# Patient Record
Sex: MALE | Race: OTHER | HISPANIC OR LATINO | URBAN - METROPOLITAN AREA
[De-identification: names, ages, dates, MRNs, and addresses within clinical notes are randomized per-mention and may not be internally consistent; named-entity substitution may affect disease eponyms.]

---

## 2017-12-06 ENCOUNTER — INPATIENT (INPATIENT)
Facility: HOSPITAL | Age: 57
LOS: 0 days | Discharge: HOME | DRG: 392 | End: 2017-12-07
Attending: INTERNAL MEDICINE | Admitting: INTERNAL MEDICINE
Payer: COMMERCIAL

## 2017-12-06 VITALS
TEMPERATURE: 98 F | SYSTOLIC BLOOD PRESSURE: 115 MMHG | WEIGHT: 171.96 LBS | DIASTOLIC BLOOD PRESSURE: 82 MMHG | HEART RATE: 91 BPM | RESPIRATION RATE: 18 BRPM | OXYGEN SATURATION: 95 %

## 2017-12-06 LAB
HCT VFR BLD CALC: 46 % — SIGNIFICANT CHANGE UP (ref 39–50)
HGB BLD-MCNC: 15.2 G/DL — SIGNIFICANT CHANGE UP (ref 13–17)
LIDOCAIN IGE QN: 56 U/L — SIGNIFICANT CHANGE UP (ref 7–60)
MCHC RBC-ENTMCNC: 28.7 PG — SIGNIFICANT CHANGE UP (ref 27–34)
MCHC RBC-ENTMCNC: 33 G/DL — SIGNIFICANT CHANGE UP (ref 32–36)
MCV RBC AUTO: 86.8 FL — SIGNIFICANT CHANGE UP (ref 80–100)
PLATELET # BLD AUTO: 178 K/UL — SIGNIFICANT CHANGE UP (ref 150–400)
RBC # BLD: 5.3 M/UL — SIGNIFICANT CHANGE UP (ref 4.2–5.8)
RBC # FLD: 13.1 % — SIGNIFICANT CHANGE UP (ref 10.3–16.9)
WBC # BLD: 13 K/UL — HIGH (ref 3.8–10.5)
WBC # FLD AUTO: 13 K/UL — HIGH (ref 3.8–10.5)

## 2017-12-06 PROCEDURE — 99285 EMERGENCY DEPT VISIT HI MDM: CPT

## 2017-12-06 PROCEDURE — 74177 CT ABD & PELVIS W/CONTRAST: CPT | Mod: 26

## 2017-12-06 PROCEDURE — 99223 1ST HOSP IP/OBS HIGH 75: CPT | Mod: GC

## 2017-12-06 RX ORDER — PIPERACILLIN AND TAZOBACTAM 4; .5 G/20ML; G/20ML
3.38 INJECTION, POWDER, LYOPHILIZED, FOR SOLUTION INTRAVENOUS ONCE
Qty: 0 | Refills: 0 | Status: COMPLETED | OUTPATIENT
Start: 2017-12-06 | End: 2017-12-06

## 2017-12-06 RX ORDER — IOHEXOL 300 MG/ML
50 INJECTION, SOLUTION INTRAVENOUS ONCE
Qty: 0 | Refills: 0 | Status: COMPLETED | OUTPATIENT
Start: 2017-12-06 | End: 2017-12-06

## 2017-12-06 RX ADMIN — IOHEXOL 50 MILLILITER(S): 300 INJECTION, SOLUTION INTRAVENOUS at 16:38

## 2017-12-06 RX ADMIN — PIPERACILLIN AND TAZOBACTAM 200 GRAM(S): 4; .5 INJECTION, POWDER, LYOPHILIZED, FOR SOLUTION INTRAVENOUS at 20:07

## 2017-12-06 NOTE — H&P ADULT - PROBLEM SELECTOR PLAN 1
improving. Elevated WBC and leukocytosis. However with subjective fevers and chills at home. improving. Elevated WBC and leukocytosis. However with subjective fevers and chills at home. meeting sepsis criteria briefly with elevated HR above 91, however now improved. No evidence of bleeding in over 24 hours. Hb stable x2. tolerating PO.   - admit to [facility]   - Con't PO cipro and flagyl   - tylenol for pain control   - encourage PO fluid hydration   - trend CBC   - consider f/u CT upon resolution of acute illness, given read concerning for colitis vs. lymphoma

## 2017-12-06 NOTE — ED ADULT TRIAGE NOTE - CHIEF COMPLAINT QUOTE
abdominal pain with blood with loose stools started last night - went to urgent care and sent here for further evaluation and treatment - HX colon polyps;

## 2017-12-06 NOTE — H&P ADULT - NSHPPHYSICALEXAM_GEN_ALL_CORE
.  VITAL SIGNS:  T(C): 36.5 (12-07-17 @ 05:33), Max: 37.2 (12-06-17 @ 17:01)  T(F): 97.7 (12-07-17 @ 05:33), Max: 98.9 (12-06-17 @ 17:01)  HR: 73 (12-07-17 @ 05:33) (73 - 91)  BP: 109/62 (12-07-17 @ 05:33) (108/70 - 116/75)  BP(mean): --  RR: 18 (12-07-17 @ 05:33) (18 - 18)  SpO2: 95% (12-07-17 @ 05:33) (95% - 97%)  Wt(kg): --    PHYSICAL EXAM:    Constitutional: WDWN resting comfortably in bed; NAD  Head: NC/AT  Eyes: PERRL, EOMI, anicteric sclera  ENT: no nasal discharge; uvula midline, no oropharyngeal erythema or exudates; MMM  Neck: supple; no JVD or thyromegaly  Respiratory: CTA B/L; no W/R/R, no retractions  Cardiac: +S1/S2; RRR; no M/R/G; PMI non-displaced  Gastrointestinal: soft, ND; tenderness to palpation RLQ; no rebound or guarding; +BSx4; no stool/blood present in rectal vault  Back: spine midline, no bony tenderness or step-offs; no CVAT B/L  Extremities: WWP, no clubbing or cyanosis; no peripheral edema  Musculoskeletal: NROM x4; no joint swelling, tenderness or erythema  Vascular: 2+ radial, femoral, DP/PT pulses B/L  Dermatologic: skin warm, dry and intact; no rashes, wounds, or scars  Lymphatic: no submandibular or cervical LAD  Neurologic: AAOx3; CNII-XII grossly intact; no focal deficits  Psychiatric: affect and characteristics of appearance, verbalizations, behaviors are appropriate

## 2017-12-06 NOTE — H&P ADULT - ATTENDING COMMENTS
Pt seen and examined at bedside on 12/6 @ 2946    Agree with HPI, ROS as above    VS, Labs, FH, SH, allergies, medications, imaging reviewed. Agree with physical exam as above    A/P: 57yo M w/ PMH HLD presenting with 3 days abdominal pain and 3 episodes of blood mixed with mucous from his rectum with leukocytosis and CT evidence of colitis.     **Sepsis  -Pt meeting 2/4 SIRS criteria on admission with evidence of colitis on CT a/p  -C/w cirpo/flagyl for tx of colitis  -LA not done in ED  -Pt HD stable  -Blood cultures not done in ED, will obtain if patient's clinical status changes    **HLD  -C/w home Lip    **Coagulopathy  -Mild, likely 2/2 decreased PO intake    Plan otherwise as outlined above....

## 2017-12-06 NOTE — H&P ADULT - HISTORY OF PRESENT ILLNESS
Pt presents with BRBPR. PMH HLD presenting w/ 3 episodes of blood with mucous presenting with LLQ tenderness.     In the ED, patient had colitis on CT. Improved after IV fluids and tolerating PO. Pt is a 55yo M w/ PMH HLD presenting w/ 3 episodes of blood with mucous and with LLQ tenderness. He reports his symptoms started after cooking a meal with ground beef 3 days ago. He reports having constipation initially, (last BM 3 days ago) followed by passing mucous with small amount of blood three times yesterday. he developed pain in his lower abdomen that did not radiate, was constant, and worsened throughout the day. His  said he could barely move. He was, however still tolerating PO and passing gas. He felt subjective fevers and chills at home, and came in to the ED today. He denies any nausea/vomiting, cough, SOB, chest pain, rash, dysuria, LE edema, difficulty walking. His symptoms at time of exam have significantly improved from 9/10 to 4/10, and the patient is hungry and walking without difficulty. He reports he had a bowel movement after drinking oral contrast that was brown in color. He had a colonoscopy last spring at [facility] to r/o microscopic colitis, and was found to have one hyperplastic polyp and internal hemorrhoids.    In the ED, patient had colitis on CT. Improved after IV fluids and tolerating PO.

## 2017-12-06 NOTE — ED PROVIDER NOTE - PROGRESS NOTE DETAILS
CT reviewed, pt still w significant pain, given extent of diverticulitis w bleeding and age, will keep for iv abx and possible GI eval. CT reviewed, pt still w significant pain but improving, given extent of diverticulitis w hx of bleeding and age, will keep for iv abx and possible GI eval.

## 2017-12-06 NOTE — H&P ADULT - NSHPLABSRESULTS_GEN_ALL_CORE
CBC Full  -  ( 06 Dec 2017 20:12 )  WBC Count : 13.0 K/uL  Hemoglobin : 15.2 g/dL  Hematocrit : 46.0 %  Platelet Count - Automated : 178 K/uL  Mean Cell Volume : 86.8 fL  Mean Cell Hemoglobin : 28.7 pg  Mean Cell Hemoglobin Concentration : 33.0 g/dL    12-06    135  |  96  |  15  ----------------------------<  98  4.3   |  22  |  1.12    Ca    9.8      06 Dec 2017 15:20    TPro  8.4<H>  /  Alb  4.8  /  TBili  2.4<H>  /  DBili  x   /  AST  25  /  ALT  20  /  AlkPhos  82  12-06    < from: CT Abdomen and Pelvis w/ Oral Cont and w/ IV Cont (12.06.17 @ 19:10) >    IMPRESSION: Long segment of wall thickening of the proximal and mid   sigmoid colon. No colonic diverticulaidentified. Infiltration of the   surrounding pericolic fat. No pericolic abscess or microperforation.   Findings could represent colitis of infectious or inflammatory etiology.   Acute diverticulitis or tumor i.e. adenocarcinoma or lymphoma cannot   however cannot be ruled out.        < end of copied text >

## 2017-12-06 NOTE — ED PROVIDER NOTE - MEDICAL DECISION MAKING DETAILS
Abd pain w complaints of brb x3, no active bleeding. Given exam P: labs, imaging, ivf reeval. Abd pain w complaints of brb x3, no active bleeding. Given exam P: labs, imaging,ppi,  ivf reeval.

## 2017-12-06 NOTE — H&P ADULT - ASSESSMENT
57yo M w/ PMH HLD presenting with 3 days abdominal pain and 3 episodes of blood mixed with mucous from his rectum with leukocytosis and CT evidence of colitis.

## 2017-12-06 NOTE — ED PROVIDER NOTE - PHYSICAL EXAMINATION
CONSTITUTIONAL: Well appearing, well nourished, awake, alert and in no apparent distress.  HEENT: Head is atraumatic. Eyes clear bilaterally, normal EOMI. Airway patent.  CARDIAC: Normal rate, regular rhythm.  Heart sounds S1, S2.   RESPIRATORY: Breath sounds clear and equal bilaterally.  GASTROINTESTINAL: Abdomen soft, non-tender, no guarding.  MUSCULOSKELETAL: Spine appears normal, range of motion is not limited, no muscle or joint tenderness.   NEUROLOGICAL: Alert and oriented, no focal deficits, no motor or sensory deficits.  SKIN: Skin normal color for race, warm, dry and intact. No evidence of rash.  PSYCHIATRIC: Alert and oriented to person, place, time/situation. normal mood and affect. no apparent risk to self or others.  Rectal : no blood in vault CONSTITUTIONAL: Well appearing, well nourished, awake, alert and in no apparent distress.  HEENT: Head is atraumatic. Eyes clear bilaterally, normal EOMI. Airway patent.  CARDIAC: Normal rate, regular rhythm.  Heart sounds S1, S2.   RESPIRATORY: Breath sounds clear and equal bilaterally.  GASTROINTESTINAL: Abdomen soft, severe tenderness w guarding in llq, no distension  MUSCULOSKELETAL: Spine appears normal, range of motion is not limited, no muscle or joint tenderness.   NEUROLOGICAL: Alert and oriented, no focal deficits, no motor or sensory deficits.  SKIN: Skin normal color for race, warm, dry and intact. No evidence of rash.  PSYCHIATRIC: Alert and oriented to person, place, time/situation. normal mood and affect. no apparent risk to self or others.  Rectal : no blood in vault

## 2017-12-06 NOTE — H&P ADULT - PROBLEM SELECTOR PLAN 4
low risk, no need for pharmacologic prophylaxis, particularly in setting of recent bleed.    Dispo - likely home in AM

## 2017-12-06 NOTE — ED PROVIDER NOTE - OBJECTIVE STATEMENT
55 yo hx of hdl w complaints of abd pain and brb x3, small amounts of blood since yesterday, no recent travel, prior surgeries. no blood thinner use. no f/c/n/v. Pain LLQ, aching in nature. 55 yo hx of hdl w complaints of abd pain and brb x3, small amounts of blood since yesterday, no recent travel, prior surgeries. no blood thinner use. no f/c/n/v. Pain LLQ, severe, aching in nature.

## 2017-12-07 ENCOUNTER — TRANSCRIPTION ENCOUNTER (OUTPATIENT)
Age: 57
End: 2017-12-07

## 2017-12-07 VITALS
RESPIRATION RATE: 18 BRPM | TEMPERATURE: 97 F | OXYGEN SATURATION: 94 % | SYSTOLIC BLOOD PRESSURE: 113 MMHG | HEART RATE: 74 BPM | DIASTOLIC BLOOD PRESSURE: 69 MMHG

## 2017-12-07 DIAGNOSIS — E78.00 PURE HYPERCHOLESTEROLEMIA, UNSPECIFIED: ICD-10-CM

## 2017-12-07 DIAGNOSIS — R63.8 OTHER SYMPTOMS AND SIGNS CONCERNING FOOD AND FLUID INTAKE: ICD-10-CM

## 2017-12-07 DIAGNOSIS — Z90.49 ACQUIRED ABSENCE OF OTHER SPECIFIED PARTS OF DIGESTIVE TRACT: Chronic | ICD-10-CM

## 2017-12-07 DIAGNOSIS — Z29.9 ENCOUNTER FOR PROPHYLACTIC MEASURES, UNSPECIFIED: ICD-10-CM

## 2017-12-07 DIAGNOSIS — K52.9 NONINFECTIVE GASTROENTERITIS AND COLITIS, UNSPECIFIED: ICD-10-CM

## 2017-12-07 LAB
ANION GAP SERPL CALC-SCNC: 13 MMOL/L — SIGNIFICANT CHANGE UP (ref 5–17)
BASOPHILS NFR BLD AUTO: 0.3 % — SIGNIFICANT CHANGE UP (ref 0–2)
BUN SERPL-MCNC: 15 MG/DL — SIGNIFICANT CHANGE UP (ref 7–23)
CALCIUM SERPL-MCNC: 9.4 MG/DL — SIGNIFICANT CHANGE UP (ref 8.4–10.5)
CHLORIDE SERPL-SCNC: 100 MMOL/L — SIGNIFICANT CHANGE UP (ref 96–108)
CO2 SERPL-SCNC: 24 MMOL/L — SIGNIFICANT CHANGE UP (ref 22–31)
CREAT SERPL-MCNC: 1.16 MG/DL — SIGNIFICANT CHANGE UP (ref 0.5–1.3)
EOSINOPHIL NFR BLD AUTO: 1.5 % — SIGNIFICANT CHANGE UP (ref 0–6)
GLUCOSE SERPL-MCNC: 103 MG/DL — HIGH (ref 70–99)
HCT VFR BLD CALC: 42.7 % — SIGNIFICANT CHANGE UP (ref 39–50)
HGB BLD-MCNC: 14.2 G/DL — SIGNIFICANT CHANGE UP (ref 13–17)
LYMPHOCYTES # BLD AUTO: 20.6 % — SIGNIFICANT CHANGE UP (ref 13–44)
MAGNESIUM SERPL-MCNC: 2.4 MG/DL — SIGNIFICANT CHANGE UP (ref 1.6–2.6)
MCHC RBC-ENTMCNC: 29.2 PG — SIGNIFICANT CHANGE UP (ref 27–34)
MCHC RBC-ENTMCNC: 33.3 G/DL — SIGNIFICANT CHANGE UP (ref 32–36)
MCV RBC AUTO: 87.9 FL — SIGNIFICANT CHANGE UP (ref 80–100)
MONOCYTES NFR BLD AUTO: 9.3 % — SIGNIFICANT CHANGE UP (ref 2–14)
NEUTROPHILS NFR BLD AUTO: 68.3 % — SIGNIFICANT CHANGE UP (ref 43–77)
PLATELET # BLD AUTO: 154 K/UL — SIGNIFICANT CHANGE UP (ref 150–400)
POTASSIUM SERPL-MCNC: 4.4 MMOL/L — SIGNIFICANT CHANGE UP (ref 3.5–5.3)
POTASSIUM SERPL-SCNC: 4.4 MMOL/L — SIGNIFICANT CHANGE UP (ref 3.5–5.3)
RBC # BLD: 4.86 M/UL — SIGNIFICANT CHANGE UP (ref 4.2–5.8)
RBC # FLD: 13.2 % — SIGNIFICANT CHANGE UP (ref 10.3–16.9)
SODIUM SERPL-SCNC: 137 MMOL/L — SIGNIFICANT CHANGE UP (ref 135–145)
WBC # BLD: 9.5 K/UL — SIGNIFICANT CHANGE UP (ref 3.8–10.5)
WBC # FLD AUTO: 9.5 K/UL — SIGNIFICANT CHANGE UP (ref 3.8–10.5)

## 2017-12-07 PROCEDURE — 85025 COMPLETE CBC W/AUTO DIFF WBC: CPT

## 2017-12-07 PROCEDURE — 83735 ASSAY OF MAGNESIUM: CPT

## 2017-12-07 PROCEDURE — 99239 HOSP IP/OBS DSCHRG MGMT >30: CPT

## 2017-12-07 PROCEDURE — 85610 PROTHROMBIN TIME: CPT

## 2017-12-07 PROCEDURE — 83690 ASSAY OF LIPASE: CPT

## 2017-12-07 PROCEDURE — 99285 EMERGENCY DEPT VISIT HI MDM: CPT | Mod: 25

## 2017-12-07 PROCEDURE — 96374 THER/PROPH/DIAG INJ IV PUSH: CPT | Mod: XU

## 2017-12-07 PROCEDURE — 74177 CT ABD & PELVIS W/CONTRAST: CPT

## 2017-12-07 PROCEDURE — 36415 COLL VENOUS BLD VENIPUNCTURE: CPT

## 2017-12-07 PROCEDURE — 85730 THROMBOPLASTIN TIME PARTIAL: CPT

## 2017-12-07 PROCEDURE — 80048 BASIC METABOLIC PNL TOTAL CA: CPT

## 2017-12-07 PROCEDURE — 81001 URINALYSIS AUTO W/SCOPE: CPT

## 2017-12-07 PROCEDURE — 80053 COMPREHEN METABOLIC PANEL: CPT

## 2017-12-07 PROCEDURE — 85027 COMPLETE CBC AUTOMATED: CPT

## 2017-12-07 RX ORDER — ASPIRIN/CALCIUM CARB/MAGNESIUM 324 MG
1 TABLET ORAL
Qty: 0 | Refills: 0 | COMMUNITY

## 2017-12-07 RX ORDER — ATORVASTATIN CALCIUM 80 MG/1
2 TABLET, FILM COATED ORAL
Qty: 0 | Refills: 0 | COMMUNITY

## 2017-12-07 RX ORDER — METRONIDAZOLE 500 MG
1 TABLET ORAL
Qty: 15 | Refills: 0 | OUTPATIENT
Start: 2017-12-07 | End: 2017-12-12

## 2017-12-07 RX ORDER — CIPROFLOXACIN LACTATE 400MG/40ML
1 VIAL (ML) INTRAVENOUS
Qty: 10 | Refills: 0 | OUTPATIENT
Start: 2017-12-07 | End: 2017-12-12

## 2017-12-07 RX ORDER — ATORVASTATIN CALCIUM 80 MG/1
20 TABLET, FILM COATED ORAL AT BEDTIME
Qty: 0 | Refills: 0 | Status: DISCONTINUED | OUTPATIENT
Start: 2017-12-07 | End: 2017-12-07

## 2017-12-07 RX ORDER — ATORVASTATIN CALCIUM 80 MG/1
1 TABLET, FILM COATED ORAL
Qty: 0 | Refills: 0 | COMMUNITY

## 2017-12-07 RX ORDER — SIMETHICONE 80 MG/1
80 TABLET, CHEWABLE ORAL ONCE
Qty: 0 | Refills: 0 | Status: COMPLETED | OUTPATIENT
Start: 2017-12-07 | End: 2017-12-07

## 2017-12-07 RX ORDER — METRONIDAZOLE 500 MG
500 TABLET ORAL EVERY 8 HOURS
Qty: 0 | Refills: 0 | Status: DISCONTINUED | OUTPATIENT
Start: 2017-12-07 | End: 2017-12-07

## 2017-12-07 RX ORDER — CIPROFLOXACIN LACTATE 400MG/40ML
500 VIAL (ML) INTRAVENOUS EVERY 12 HOURS
Qty: 0 | Refills: 0 | Status: DISCONTINUED | OUTPATIENT
Start: 2017-12-07 | End: 2017-12-07

## 2017-12-07 RX ORDER — ACETAMINOPHEN 500 MG
650 TABLET ORAL EVERY 6 HOURS
Qty: 0 | Refills: 0 | Status: DISCONTINUED | OUTPATIENT
Start: 2017-12-07 | End: 2017-12-07

## 2017-12-07 RX ADMIN — Medication 650 MILLIGRAM(S): at 05:24

## 2017-12-07 RX ADMIN — Medication 500 MILLIGRAM(S): at 12:46

## 2017-12-07 RX ADMIN — SIMETHICONE 80 MILLIGRAM(S): 80 TABLET, CHEWABLE ORAL at 09:27

## 2017-12-07 RX ADMIN — Medication 500 MILLIGRAM(S): at 04:29

## 2017-12-07 RX ADMIN — Medication 650 MILLIGRAM(S): at 05:55

## 2017-12-07 NOTE — DISCHARGE NOTE ADULT - PATIENT PORTAL LINK FT
“You can access the [employer] Patient Portal, offered by [facility], by registering with the following website: [email or web address]”

## 2017-12-07 NOTE — DISCHARGE NOTE ADULT - HOSPITAL COURSE
Pt is a 55yo M w/ PMH of HLD presenting w/ 3 episodes of blood with mucous and with LLQ tenderness.  His symptoms at time of exam have significantly improved throughout his stay. Patient intially has leuckocytosis on exam however taht trended downward. CT scan A/P showed wall thickening of the proximal and mid sigmoid colon that likely suggested colilits. Patient was started on ciprofloxacin and flagyl for which improvement was noted. Patient will be discharged on ciprofloxacin and flagyl for a 5 day total course

## 2017-12-07 NOTE — DISCHARGE NOTE ADULT - MEDICATION SUMMARY - MEDICATIONS TO TAKE
I will START or STAY ON the medications listed below when I get home from the hospital:    metroNIDAZOLE 500 mg oral tablet  -- 1 tab(s) by mouth every 8 hours  -- Indication: For COLITIS    aspirin 81 mg oral tablet  -- 1 tab(s) by mouth once a day  -- Indication: For Need for prophylactic measure    atorvastatin 10 mg oral tablet  -- 2 tab(s) by mouth once a day  -- Indication: For High cholesterol    ciprofloxacin 500 mg oral tablet  -- 1 tab(s) by mouth every 12 hours  -- Indication: For COLITIS

## 2017-12-07 NOTE — DISCHARGE NOTE ADULT - CARE PLAN
Principal Discharge DX:	Colitis  Instructions for follow-up, activity and diet:	Colitis refers to inflammation of the inner lining of the colon. There are numerous causes of colitis including infection, inflammatory bowel disease (Crohn's disease, ulcerative colitis), ischemic colitis, allergic reactions, and microscopic colitis.   Symptoms of colitis depend upon the cause and may include  abdominal pain,  cramping,  diarrhea, with or without blood in the stool (one of the hallmark symptoms of colitis).  You had A CT abdomen image performed that showed colitis. Please take cipro  Secondary Diagnosis:	High cholesterol  Instructions for follow-up, activity and diet:	continue home dose of lipitor Principal Discharge DX:	Colitis  Goal:	resolve infection  Instructions for follow-up, activity and diet:	You were admitted with abdominal pain and diarrhea and were found to have coilitis. Colitis refers to inflammation of the inner lining of the colon. There are numerous causes of colitis including infection, inflammatory bowel disease (Crohn's disease, ulcerative colitis), ischemic colitis, allergic reactions, and microscopic colitis.   Symptoms of colitis depend upon the cause and may include  abdominal pain,  cramping,  diarrhea, with or without blood in the stool (one of the hallmark symptoms of colitis).  You had A CT abdomen image performed that showed colitis. Please take ciprofloxacin 500 mg 1 tablet twice a day. Please take metronidazole 500 mg three times a day. Please take them to completion. If you experience worsening diarrhea, fevers, chills or worsening abdominal pain, Please return to the emergency room immediately  Secondary Diagnosis:	High cholesterol  Instructions for follow-up, activity and diet:	continue home dose of lipitor

## 2017-12-07 NOTE — DISCHARGE NOTE ADULT - PLAN OF CARE
Colitis refers to inflammation of the inner lining of the colon. There are numerous causes of colitis including infection, inflammatory bowel disease (Crohn's disease, ulcerative colitis), ischemic colitis, allergic reactions, and microscopic colitis.   Symptoms of colitis depend upon the cause and may include  abdominal pain,  cramping,  diarrhea, with or without blood in the stool (one of the hallmark symptoms of colitis).  You had A CT abdomen image performed that showed colitis. Please take cipro continue home dose of lipitor resolve infection You were admitted with abdominal pain and diarrhea and were found to have coilitis. Colitis refers to inflammation of the inner lining of the colon. There are numerous causes of colitis including infection, inflammatory bowel disease (Crohn's disease, ulcerative colitis), ischemic colitis, allergic reactions, and microscopic colitis.   Symptoms of colitis depend upon the cause and may include  abdominal pain,  cramping,  diarrhea, with or without blood in the stool (one of the hallmark symptoms of colitis).  You had A CT abdomen image performed that showed colitis. Please take ciprofloxacin 500 mg 1 tablet twice a day. Please take metronidazole 500 mg three times a day. Please take them to completion. If you experience worsening diarrhea, fevers, chills or worsening abdominal pain, Please return to the emergency room immediately

## 2017-12-07 NOTE — DISCHARGE NOTE ADULT - OTHER SIGNIFICANT FINDINGS
CT Abdomen and Pelvis w/ Oral Cont and w/ IV Cont (12.06.17 @ 19:10) >    IMPRESSION: Long segment of wall thickening of the proximal and mid   sigmoid colon. No colonic diverticulaidentified. Infiltration of the   surrounding pericolic fat. No pericolic abscess or microperforation.   Findings could represent colitis of infectious or inflammatory etiology.   Acute diverticulitis or tumor i.e. adenocarcinoma or lymphoma cannot   however cannot be ruled out.    < end of copied text >

## 2017-12-11 DIAGNOSIS — D72.829 ELEVATED WHITE BLOOD CELL COUNT, UNSPECIFIED: ICD-10-CM

## 2017-12-11 DIAGNOSIS — R10.9 UNSPECIFIED ABDOMINAL PAIN: ICD-10-CM

## 2017-12-11 DIAGNOSIS — K52.9 NONINFECTIVE GASTROENTERITIS AND COLITIS, UNSPECIFIED: ICD-10-CM

## 2017-12-11 DIAGNOSIS — E78.00 PURE HYPERCHOLESTEROLEMIA, UNSPECIFIED: ICD-10-CM

## 2017-12-11 DIAGNOSIS — R11.2 NAUSEA WITH VOMITING, UNSPECIFIED: ICD-10-CM

## 2018-06-11 PROBLEM — E78.00 PURE HYPERCHOLESTEROLEMIA, UNSPECIFIED: Chronic | Status: ACTIVE | Noted: 2017-12-06

## 2018-06-18 PROBLEM — Z00.00 ENCOUNTER FOR PREVENTIVE HEALTH EXAMINATION: Status: ACTIVE | Noted: 2018-06-18

## 2018-07-03 ENCOUNTER — APPOINTMENT (OUTPATIENT)
Dept: UROLOGY | Facility: CLINIC | Age: 58
End: 2018-07-03
Payer: COMMERCIAL

## 2018-07-03 VITALS — SYSTOLIC BLOOD PRESSURE: 128 MMHG | HEART RATE: 80 BPM | DIASTOLIC BLOOD PRESSURE: 87 MMHG | TEMPERATURE: 97.7 F

## 2018-07-03 DIAGNOSIS — Z87.891 PERSONAL HISTORY OF NICOTINE DEPENDENCE: ICD-10-CM

## 2018-07-03 PROCEDURE — 99204 OFFICE O/P NEW MOD 45 MIN: CPT

## 2018-07-03 RX ORDER — ATORVASTATIN CALCIUM 80 MG/1
TABLET, FILM COATED ORAL
Refills: 0 | Status: ACTIVE | COMMUNITY

## 2018-07-03 RX ORDER — ASPIRIN 81 MG
81 TABLET, DELAYED RELEASE (ENTERIC COATED) ORAL
Refills: 0 | Status: ACTIVE | COMMUNITY

## 2018-07-05 LAB
APPEARANCE: CLEAR
BACTERIA UR CULT: NORMAL
BACTERIA: NEGATIVE
BILIRUBIN URINE: NEGATIVE
BLOOD URINE: ABNORMAL
COLOR: YELLOW
GLUCOSE QUALITATIVE U: NEGATIVE MG/DL
KETONES URINE: NEGATIVE
LEUKOCYTE ESTERASE URINE: NEGATIVE
MICROSCOPIC-UA: NORMAL
NITRITE URINE: NEGATIVE
PH URINE: 5
PROTEIN URINE: NEGATIVE MG/DL
RED BLOOD CELLS URINE: 3 /HPF
SPECIFIC GRAVITY URINE: 1.02
SQUAMOUS EPITHELIAL CELLS: 0 /HPF
UROBILINOGEN URINE: NEGATIVE MG/DL
WHITE BLOOD CELLS URINE: 1 /HPF

## 2018-07-09 LAB — CORE LAB NON GYN CYTOLOGY TO LENOX HILL: NORMAL

## 2018-08-07 ENCOUNTER — APPOINTMENT (OUTPATIENT)
Dept: UROLOGY | Facility: CLINIC | Age: 58
End: 2018-08-07
Payer: COMMERCIAL

## 2018-08-07 VITALS
HEART RATE: 72 BPM | WEIGHT: 173 LBS | DIASTOLIC BLOOD PRESSURE: 80 MMHG | OXYGEN SATURATION: 98 % | HEIGHT: 65 IN | TEMPERATURE: 99 F | BODY MASS INDEX: 28.82 KG/M2 | SYSTOLIC BLOOD PRESSURE: 138 MMHG

## 2018-08-07 DIAGNOSIS — R31.29 OTHER MICROSCOPIC HEMATURIA: ICD-10-CM

## 2018-08-07 PROCEDURE — 52000 CYSTOURETHROSCOPY: CPT

## 2019-02-12 ENCOUNTER — APPOINTMENT (OUTPATIENT)
Dept: UROLOGY | Facility: CLINIC | Age: 59
End: 2019-02-12
Payer: COMMERCIAL

## 2019-02-12 VITALS — HEART RATE: 75 BPM | DIASTOLIC BLOOD PRESSURE: 87 MMHG | SYSTOLIC BLOOD PRESSURE: 135 MMHG | TEMPERATURE: 97.6 F

## 2019-02-12 PROCEDURE — 51798 US URINE CAPACITY MEASURE: CPT

## 2019-02-12 PROCEDURE — 99213 OFFICE O/P EST LOW 20 MIN: CPT | Mod: 25

## 2019-02-12 RX ORDER — TAMSULOSIN HYDROCHLORIDE 0.4 MG/1
0.4 CAPSULE ORAL
Qty: 30 | Refills: 11 | Status: ACTIVE | COMMUNITY
Start: 2019-02-12 | End: 1900-01-01

## 2019-02-12 NOTE — HISTORY OF PRESENT ILLNESS
[FreeTextEntry1] : PSA 0.6 5/2018\par +micrhematuria work up negative in 8/2018\par reporting intermittent FOS. nocturia x 1. mild urgency.

## 2019-02-12 NOTE — ASSESSMENT
[FreeTextEntry1] : BPH\par +symptoms noted\par considering urolfit\par trial flomax 0.4 x 1 month\par PVR 32\par f/u 6 months

## 2019-03-12 ENCOUNTER — APPOINTMENT (OUTPATIENT)
Dept: UROLOGY | Facility: CLINIC | Age: 59
End: 2019-03-12
Payer: COMMERCIAL

## 2019-03-12 VITALS — DIASTOLIC BLOOD PRESSURE: 86 MMHG | TEMPERATURE: 97.9 F | HEART RATE: 83 BPM | SYSTOLIC BLOOD PRESSURE: 132 MMHG

## 2019-03-12 DIAGNOSIS — N40.1 BENIGN PROSTATIC HYPERPLASIA WITH LOWER URINARY TRACT SYMPMS: ICD-10-CM

## 2019-03-12 DIAGNOSIS — R35.1 BENIGN PROSTATIC HYPERPLASIA WITH LOWER URINARY TRACT SYMPMS: ICD-10-CM

## 2019-03-12 PROCEDURE — 99213 OFFICE O/P EST LOW 20 MIN: CPT

## 2019-03-12 NOTE — ASSESSMENT
[FreeTextEntry1] : BPH\par PVR 0\par options - including increasing tamsulosin, starting anticholinergic, moving to urolfit\par discussed weight loss\par would like trial weight loss\par f/u 3 month\par would like to stop medication

## 2019-03-12 NOTE — HISTORY OF PRESENT ILLNESS
[FreeTextEntry1] : tried flomax 0.4\par mild improvement\par IPSS 7\par signficant daytime frequency\par sensation of incomplete emptyp\par

## 2019-06-11 ENCOUNTER — APPOINTMENT (OUTPATIENT)
Dept: UROLOGY | Facility: CLINIC | Age: 59
End: 2019-06-11

## 2020-09-05 NOTE — ED PROVIDER NOTE - NS ED MD DISPO ADMIT [facility] UNIT
REGIONAL I performed the initial face to face bedside interview with this patient regarding history of present illness, review of symptoms and past medical, social and family history.  I completed an independent physical examination.  I was the initial provider who evaluated this patient.  The history, review of symptoms and examination was documented by the NP in my presence and I attest to the accuracy of the documentation.  I have signed out the follow up of any pending tests (i.e. labs, radiological studies) to the NP.  I have discussed the patient’s plan of care and disposition with the NP.

## 2021-01-01 NOTE — DISCHARGE NOTE ADULT - INSTRUCTIONS
The BRAT diet is a bland food diet recommended for adults and children. The benefits of using the BRAT diet to treat upset stomach and diarrhea include:    The foods make in the diet make your stools firmer. That’s because the foods are considered “binding” foods — low-fiber, bland, starchy foods.  The foods help replace nutrients your body needs and has lost due to vomiting and diarrhea. Bananas, for example, are high in the vitamin potassium.
Statement Selected